# Patient Record
Sex: MALE | Race: WHITE | Employment: OTHER | URBAN - METROPOLITAN AREA
[De-identification: names, ages, dates, MRNs, and addresses within clinical notes are randomized per-mention and may not be internally consistent; named-entity substitution may affect disease eponyms.]

---

## 2018-01-01 ENCOUNTER — HOSPITAL ENCOUNTER (EMERGENCY)
Age: 58
End: 2018-09-20
Attending: EMERGENCY MEDICINE
Payer: MEDICARE

## 2018-01-01 VITALS — BODY MASS INDEX: 38.36 KG/M2 | WEIGHT: 315 LBS | HEIGHT: 76 IN

## 2018-01-01 DIAGNOSIS — I46.9 CARDIAC ARREST (HCC): Primary | ICD-10-CM

## 2018-01-01 PROCEDURE — 99285 EMERGENCY DEPT VISIT HI MDM: CPT

## 2018-01-01 PROCEDURE — 2500000003 HC RX 250 WO HCPCS: Performed by: EMERGENCY MEDICINE

## 2018-01-01 PROCEDURE — 6360000002 HC RX W HCPCS: Performed by: EMERGENCY MEDICINE

## 2018-01-01 RX ORDER — PROCAINAMIDE HYDROCHLORIDE 500 MG/ML
INJECTION INTRAMUSCULAR; INTRAVENOUS DAILY PRN
Status: COMPLETED | OUTPATIENT
Start: 2018-01-01 | End: 2018-01-01

## 2018-01-01 RX ADMIN — PROCAINAMIDE HYDROCHLORIDE 1000 MG: 100 INJECTION, SOLUTION INTRAMUSCULAR; INTRAVENOUS at 02:14

## 2018-01-01 RX ADMIN — LIDOCAINE HYDROCHLORIDE 100 MG: 20 INJECTION, SOLUTION INTRAVENOUS at 02:16

## 2018-01-01 RX ADMIN — SODIUM BICARBONATE 50 MEQ: 84 INJECTION, SOLUTION INTRAVENOUS at 02:11

## 2018-01-01 RX ADMIN — EPINEPHRINE 1 MG: 0.1 INJECTION, SOLUTION ENDOTRACHEAL; INTRACARDIAC; INTRAVENOUS at 02:11

## 2018-09-20 NOTE — ED NOTES
Pt arrived to ED via EMS with c/o Cardiac Arrest. EMS reports pt was smoking Marijuana with his girlfriend and Arrested in front of girlfriend. EMS arrived on scene approximately 25 minutes prior to arrival to out ED. EMS reports prior to arrival to our ED, initiated CPR with Jodie Andes placed right tibia, ETT tube placed, Epi 6x, Bicarb 1x, Calcium 1x, Amio 1x, pt was shocked 5x. See code charting for treatment after arrival to our facility.       Sandra Wilkes RN  09/20/18 [id number]

## 2018-09-20 NOTE — ED PROVIDER NOTES
[address] [place] [address] ED  eMERGENCY dEPARTMENT eNCOUnter      Pt Name: Beryl Lombardo  MRN: [id number]  [place] 1960  Date of evaluation: 9/20/2018  Provider: Adam Sky MD    CHIEF COMPLAINT     No chief complaint on file. HISTORY OF PRESENT ILLNESS  (Location/Symptom, Timing/Onset, Context/Setting, Quality, Duration, Modifying Factors, Severity.)   Beryl Lombardo is a 59 y.o. male who presents to the emergency department In cardiac arrest.  History is per paramedics. Patient suffered cardiac arrest at home. He was home with a woman who identified herself to paramedics as his girlfriend. She told paramedics that she and the patient were \"smoking dope\". Patient then suffered his cardiac arrest.  The girlfriend did not start any form of CPR attempt. She did call 911. Upon paramedic arrival patient is found to be in asystole. He is intubated. IV access is established in a left peripheral line as well as right pretibial I0. He did receive multiple rounds of medications to include epi, bicarb, calcium, magnesium and ultimately amiodarone. His rhythm changed to a fine V. fib versus asystole with agonal efforts. He was ultimately shocked 5 times in route without change. By the time of arrival in the emergency department he had now been down close to an hour. We continued resuscitation efforts. He is ventilated by ET tube. Compressions are in place by Stefanie. He received additional medications to include epi, bicarb. We also proceeded with procainamide and ultimately lidocaine given the appearance of possibility of V. fib. He was shocked additionally. Ultimately patient entered sustained asystole. Code was called at 2:18 AM      Nursing Notes were reviewed. ALLERGIES     Patient has no allergy information on record. CURRENT MEDICATIONS       Previous Medications    No medications on file       PAST MEDICAL HISTORY   No past medical history on file.     SURGICAL HISTORY     No past surgical history on file. FAMILY HISTORY     No family history on file. No family status information on file. SOCIAL HISTORY          REVIEW OF SYSTEMS    (2-9 systems for level 4, 10 or more for level 5)     Review of Systems   Unable to perform ROS: Patient unresponsive       Except as noted above the remainder of the review of systems was reviewed and negative. PHYSICAL EXAM    (up to 7 for level 4, 8 or more for level 5)   There were no vitals filed for this visit. Physical exam reflects an obese male. He is unresponsive. Compressions are in place via [place]. Ventilation in place via ET tube. He does have blood noted in his oral secretions. Sclera are injected. Pupils are pinpoint and unresponsive. He has appearance of exophthalmos. No intraoral lesion. JVD noted. He has no spontaneous pulse. Lungs indicate he is being ventilated bilaterally. Abdomen is obese but soft. Normal external genitalia. He has 4+ pitting edema to the bilateral lower extremities he has compression stockings and orthopedic shoes in place. He has a right IO in place the right pretibial region that is functioning well. He had a left upper extremity IV in place that had been removed. Dressing in place. No active bleeding. DIAGNOSTIC RESULTS     EKG: All EKG's are interpreted by the Emergency Department Physician who either signs or Co-signs this chart in the absence of a cardiologist.    RADIOLOGY:   Non-plain film images such as CT, Ultrasound and MRI are read by the radiologist. Plain radiographic images are visualized and preliminarily interpreted by the emergency physician with the below findings:      Interpretation per the Radiologist below, if available at the time of this note:    No orders to display       LABS:  Labs Reviewed - No data to display    All other labs were within normal range or not returned as of this dictation.     EMERGENCY DEPARTMENT COURSE and DIFFERENTIAL DIAGNOSIS/MDM: Vitals: There were no vitals filed for this visit. Patient is evaluated on arrival.  Resuscitative efforts continued. Patient's appearance would suggest chronic comorbidities. He has had no recent care to review within our medical system for the last year. His resuscitative efforts ultimately proved futile with sustained asystole despite use of every possible medication as well as defibrillation efforts. Code is called at 2:18 AM.  Pastoral care is present to aid in speaking with family. Care is discussed with . Patient is a 's case. CONSULTS:  No Primary Care Physician    Case discussed with . Patient is a 's case        PROCEDURES:  None    FINAL IMPRESSION      1. Cardiac arrest [facility])          DISPOSITION/PLAN   DISPOSITION  2018 02:19:12 AM      PATIENT REFERRED TO:   No follow-up provider specified.     DISCHARGE MEDICATIONS:     New Prescriptions    No medications on file       (Please note that portions of this note were completed with a voice recognition program.  Efforts were made to edit the dictations but occasionally words are mis-transcribed.)    Augie Rogers MD  Attending Emergency Physician         Augie Rogers MD  [phone]